# Patient Record
Sex: MALE | Race: WHITE | URBAN - METROPOLITAN AREA
[De-identification: names, ages, dates, MRNs, and addresses within clinical notes are randomized per-mention and may not be internally consistent; named-entity substitution may affect disease eponyms.]

---

## 2024-01-18 ENCOUNTER — OFFICE VISIT (OUTPATIENT)
Dept: FAMILY MEDICINE CLINIC | Facility: CLINIC | Age: 74
End: 2024-01-18
Payer: MEDICARE

## 2024-01-18 VITALS
HEART RATE: 60 BPM | HEIGHT: 70 IN | BODY MASS INDEX: 30.49 KG/M2 | RESPIRATION RATE: 16 BRPM | SYSTOLIC BLOOD PRESSURE: 133 MMHG | TEMPERATURE: 97 F | OXYGEN SATURATION: 96 % | DIASTOLIC BLOOD PRESSURE: 70 MMHG | WEIGHT: 213 LBS

## 2024-01-18 DIAGNOSIS — L30.9 DERMATITIS: Primary | ICD-10-CM

## 2024-01-18 PROCEDURE — 99202 OFFICE O/P NEW SF 15 MIN: CPT | Performed by: NURSE PRACTITIONER

## 2024-01-18 RX ORDER — TRIAMCINOLONE ACETONIDE 1 MG/G
CREAM TOPICAL 2 TIMES DAILY
Qty: 1 EACH | Refills: 0 | Status: SHIPPED | OUTPATIENT
Start: 2024-01-18 | End: 2024-02-01

## 2024-01-18 RX ORDER — LISINOPRIL 20 MG/1
20 TABLET ORAL DAILY
COMMUNITY
Start: 2023-11-22

## 2024-01-18 RX ORDER — EZETIMIBE 10 MG/1
10 TABLET ORAL DAILY
COMMUNITY
Start: 2023-11-21

## 2024-01-18 RX ORDER — METOPROLOL SUCCINATE 25 MG/1
25 TABLET, EXTENDED RELEASE ORAL DAILY
COMMUNITY
Start: 2023-11-21

## 2024-01-18 RX ORDER — ASPIRIN 325 MG
325 TABLET ORAL DAILY
COMMUNITY

## 2024-01-18 RX ORDER — ATORVASTATIN CALCIUM 80 MG/1
80 TABLET, FILM COATED ORAL DAILY
COMMUNITY
Start: 2023-11-22

## 2024-01-18 NOTE — PROGRESS NOTES
CHIEF COMPLAINT:     Chief Complaint   Patient presents with    Rash     Rash on left arm, itchy, x2 weeks           HPI:    Lc Gar is a 73 year old male who presents for evaluation of a rash.  Per patient rash started in the past 2  weeks. Rash has been consistent since onset.  Patient denies similar rash in the past. The rash is characterized by red bumps. The affected location(s) include left forearm. Patient has treated rash with Eucerin.  Associated symptoms include: itching  Exposure: none known, but wife did change body wash and laundry detergent.      Current Outpatient Medications   Medication Sig Dispense Refill    atorvastatin 80 MG Oral Tab Take 1 tablet (80 mg total) by mouth daily.      lisinopril 20 MG Oral Tab Take 1 tablet (20 mg total) by mouth daily.      metoprolol succinate ER 25 MG Oral Tablet 24 Hr Take 1 tablet (25 mg total) by mouth daily.      ezetimibe 10 MG Oral Tab Take 1 tablet (10 mg total) by mouth daily.      aspirin 325 MG Oral Tab Take 1 tablet (325 mg total) by mouth daily.      triamcinolone 0.1 % External Cream Apply topically 2 (two) times daily for 14 days. 1 each 0      No past medical history on file.   No past surgical history on file.   No family history on file.   Social History     Socioeconomic History    Marital status:          REVIEW OF SYSTEMS:   GENERAL: feels well otherwise  SKIN: Per HPI.   HEENT: Denies rhinorrhea, edema of the lips or swelling of throat.  CARDIOVASCULAR: Denies chest pains or palpitations.  LUNGS: Denies shortness of breath with exertion or rest. No cough or wheezing.  LYMPH: Denies enlargement of the lymph nodes.        EXAM:   /70   Pulse 60   Temp 96.7 °F (35.9 °C) (Temporal)   Resp 16   Ht 5' 10\" (1.778 m)   Wt 213 lb (96.6 kg)   SpO2 96%   BMI 30.56 kg/m²   GENERAL: well developed, well nourished,in no apparent distress  SKIN: diffuse papular rash to left forearm with some scabbing due to scratching.  No  blistering or underlying infection noted  EYES: PERRLA, EOMI, conjunctiva are clear  LUNGS: Clear to auscultation bilaterally.  No wheezing, rhonchi, or rales.  No diminished breath sounds. No increased work of breathing.   CARDIO: RRR without murmur  LYMPH: No cervical lymphadenopathy.     ASSESSMENT AND PLAN:   Lc Gar is a 73 year old male who presents for evaluation of a rash. Findings are consistent with:    ASSESSMENT:  Encounter Diagnosis   Name Primary?    Dermatitis Yes       PLAN: Meds as listed below.  Comfort measures as described in Patient Instructions.  Skin care discussed with patient.     Meds & Refills for this Visit:  Requested Prescriptions     Signed Prescriptions Disp Refills    triamcinolone 0.1 % External Cream 1 each 0     Sig: Apply topically 2 (two) times daily for 14 days.       Risk and benefits of medication discussed.     The patient indicates understanding of these issues and agrees to the plan.  The patient is asked to see PCP in 7 days if sx's are not improving or if they worsen.    Patient Instructions   Follow up in one week if no improvement